# Patient Record
Sex: FEMALE | Race: OTHER | NOT HISPANIC OR LATINO
[De-identification: names, ages, dates, MRNs, and addresses within clinical notes are randomized per-mention and may not be internally consistent; named-entity substitution may affect disease eponyms.]

---

## 2021-03-02 PROBLEM — Z00.00 ENCOUNTER FOR PREVENTIVE HEALTH EXAMINATION: Status: ACTIVE | Noted: 2021-03-02

## 2021-03-09 ENCOUNTER — NON-APPOINTMENT (OUTPATIENT)
Age: 61
End: 2021-03-09

## 2021-03-10 ENCOUNTER — APPOINTMENT (OUTPATIENT)
Dept: BREAST CENTER | Facility: CLINIC | Age: 61
End: 2021-03-10
Payer: COMMERCIAL

## 2021-03-10 VITALS
HEART RATE: 75 BPM | WEIGHT: 175 LBS | DIASTOLIC BLOOD PRESSURE: 76 MMHG | BODY MASS INDEX: 29.16 KG/M2 | HEIGHT: 65 IN | SYSTOLIC BLOOD PRESSURE: 122 MMHG

## 2021-03-10 DIAGNOSIS — Z78.9 OTHER SPECIFIED HEALTH STATUS: ICD-10-CM

## 2021-03-10 PROCEDURE — 99203 OFFICE O/P NEW LOW 30 MIN: CPT

## 2021-03-10 PROCEDURE — 99072 ADDL SUPL MATRL&STAF TM PHE: CPT

## 2021-03-16 NOTE — PAST MEDICAL HISTORY
[Menarche Age ____] : age at menarche was [unfilled] [Menopause Age____] : age at menopause was [unfilled] [Total Preg ___] : G[unfilled] [Abortions ___] : Abortions:[unfilled] [History of Hormone Replacement Treatment] : has no history of hormone replacement treatment [FreeTextEntry7] : yes  [FreeTextEntry8] : no

## 2021-03-16 NOTE — DATA REVIEWED
[FreeTextEntry1] : LHR (12/23/2020) B/L MG- Dense. RUOQ focal asymmetries. RUOQ, 7-8FN coarse benign appearing calc present with question of adjacent amorphous calcs. L benign appearing calcs BIRADS 0\par \par LHR (2/4/2021) Dx R MG & US- Dense. RUOQ faint nodular opacity with a macrocalc. Additionally grouped microcalcs are present which are considered indeterminate. Bx is recommended because of the presence of microcalcs. The asymmetric opacities that were seen on the prior study of 12/23/2020 appear compressible. US- R 0.5cm, 8:30, 8FN hypoechoic nodule with a hyperechoic center, consistent with LN. R 0.7cm, 10:00, 2FN hypoechoic nodule, probably representing a midlly complex cyst. R 0.5cm, 10:00, 6FN hypoechoic nodule with central macrocalcification, this corresponds to the nodular opacity that is seen on the MG. (US guided core is recommended). BIRADS 4. \par \par LHR (2/22/2021) US guided core bx for R 0.5cm, 10:00, 6FN nodule- "Heart shaped" clip. Intraductal papilloma associated with calcs. Adjacent lobular carcinoma in situ associated with calcs. (Path in [place])

## 2021-03-16 NOTE — HISTORY OF PRESENT ILLNESS
[FreeTextEntry1] : ILENE is a 60 year postmenopausal female referred by Claritza Calabrese NP, presents for evaluation of right 0.5cm, 10:00, 6FN Intraductal papilloma associated with calcs and adjacent lobular carcinoma in situ associated with calcs found on screening imaging. Patient has no breast complaints today. Denies nipple discharge, nipple/breast skin changes or dimpling. Denies fever and chills. \par \par \par YAW- 65.5% Lifetime Risk Discussed patients high risk status and recommendations for close surveillance. Patient understands and would like to be followed closely.\par \par Discussed benefits of surveillance and well as implication of the sensitivity of breast MRI. Patient understands and agrees to go forward with MRI for pre op planning.\par \par Discussed recommendation to excise this lesion. With pre op MRI first. Patient expressed understanding.\par \par Discussed importance and implication of genetic testing in regards to her family history and offspring. Patient would like to think about testing.

## 2021-04-01 ENCOUNTER — NON-APPOINTMENT (OUTPATIENT)
Age: 61
End: 2021-04-01

## 2021-04-01 DIAGNOSIS — R92.8 OTHER ABNORMAL AND INCONCLUSIVE FINDINGS ON DIAGNOSTIC IMAGING OF BREAST: ICD-10-CM

## 2021-04-09 ENCOUNTER — NON-APPOINTMENT (OUTPATIENT)
Age: 61
End: 2021-04-09

## 2021-04-13 ENCOUNTER — APPOINTMENT (OUTPATIENT)
Dept: BREAST CENTER | Facility: CLINIC | Age: 61
End: 2021-04-13
Payer: COMMERCIAL

## 2021-04-13 ENCOUNTER — APPOINTMENT (OUTPATIENT)
Dept: BREAST CENTER | Facility: CLINIC | Age: 61
End: 2021-04-13

## 2021-04-13 VITALS
DIASTOLIC BLOOD PRESSURE: 80 MMHG | WEIGHT: 175 LBS | HEART RATE: 81 BPM | HEIGHT: 65 IN | SYSTOLIC BLOOD PRESSURE: 118 MMHG | BODY MASS INDEX: 29.16 KG/M2

## 2021-04-13 PROCEDURE — 99213 OFFICE O/P EST LOW 20 MIN: CPT

## 2021-04-13 PROCEDURE — 99072 ADDL SUPL MATRL&STAF TM PHE: CPT

## 2021-04-13 NOTE — DATA REVIEWED
[FreeTextEntry1] : LHR (12/23/2020) B/L MG- Dense. RUOQ focal asymmetries. RUOQ, 7-8FN coarse benign appearing calc present with question of adjacent amorphous calcs. L benign appearing calcs BIRADS 0\par \par LHR (2/4/2021) Dx R MG & US- Dense. RUOQ faint nodular opacity with a macrocalc. Additionally grouped microcalcs are present which are considered indeterminate. Bx is recommended because of the presence of microcalcs. The asymmetric opacities that were seen on the prior study of 12/23/2020 appear compressible. US- R 0.5cm, 8:30, 8FN hypoechoic nodule with a hyperechoic center, consistent with LN. R 0.7cm, 10:00, 2FN hypoechoic nodule, probably representing a midlly complex cyst. R 0.5cm, 10:00, 6FN hypoechoic nodule with central macrocalcification, this corresponds to the nodular opacity that is seen on the MG. (US guided core is recommended). BIRADS 4. \par \par LHR (2/22/2021) US guided core bx for R 0.5cm, 10:00, 6FN nodule- "Heart shaped" clip. Intraductal papilloma associated with calcs. Adjacent lobular carcinoma in situ associated with calcs. (Path in [place]) \par \par 3/31/2021 (R) bilateral breast MRI showing tissue marker in UOQ right breast at site of known high risk lesion, asymmetric segmental non mass enhancement associated with tissue marker, stereotactic biopsy of residual calcifications located posterior to known high risk lesion prior to excisional biopsy should be considered. BIRADS 4 \par \par 4/6/2021 (R) right stereotactic biopsy Right breast upper outer: breast tissue with atypical ductal hyperplasia and lobular carcinoma in situ (LCIS). Calcifications are present in LCIS and benign epithelium.\par

## 2021-04-13 NOTE — PAST MEDICAL HISTORY
Patient Education     Kegel Exercises  Kegel exercises don’t need special clothing or equipment. They’re easy to learn and simple to do. And if you do them right, no one can tell you’re doing them, so they can be done almost anywhere. Your healthcare provider, nurse, or physical therapist can answer any questions you have and help you get started.    A weak pelvic floor  The pelvic floor muscles may weaken due to aging, pregnancy and vaginal childbirth, injury, surgery, chronic cough, or lack of exercise. If the pelvic floor is weak, your bladder and other pelvic organs may sag out of place. The urethra may also open too easily and allow urine to leak out. Kegel exercises can help you strengthen your pelvic floor muscles. Then they can better support the pelvic organs and control urine flow.  How Kegel exercises are done  Try each of the Kegel exercises described below. When you’re doing them, try not to move your leg, buttock, or stomach muscles:  · Contract as if you were stopping your urine stream. But do it when you’re not urinating.  · Tighten your rectum as if trying not to pass gas. Contract your anus, but don’t move your buttocks.  · You may place a finger or 2 in the vagina and squeeze your finger with your vagina to learn which muscles to tighten.  Try to hold each Kegel for a slow count to 5. You probably won’t be able to hold them for that long at first. But keep practicing. It will get easier as your pelvic floor gets stronger. Eventually, special weights that you place in your vagina may be recommended to help make your Kegels even more effective. Visit your healthcare provider if you have difficulties doing Kegel exercises.  Helpful hints  Here are some tips to follow:  · Do your Kegels as often as you can. The more you do them, the faster you’ll feel the results.  · Pick an activity you do often as a reminder. For instance, do your Kegels every time you sit down.  · Tighten your pelvic floor before  you sneeze, get up from a chair, cough, laugh, or lift. This protects your pelvic floor from injury and can help prevent urine leakage.   Date Last Reviewed: 10/1/2017  © 1760-2306 [employer]. [address] [place] [address], [place] [address]. All rights reserved. This information is not intended as a substitute for professional medical care. Always follow your healthcare professional's instructions.            [Menarche Age ____] : age at menarche was [unfilled] [Menopause Age____] : age at menopause was [unfilled] [Total Preg ___] : G[unfilled] [Abortions ___] : Abortions:[unfilled] [History of Hormone Replacement Treatment] : has no history of hormone replacement treatment [FreeTextEntry5] : no [FreeTextEntry7] : yes  [FreeTextEntry8] : no

## 2021-04-13 NOTE — REASON FOR VISIT
[Follow-Up: _____] : a [unfilled] follow-up visit [FreeTextEntry1] : right breast intraductal papilloma, LCIS, and atypical ductal hyperplasia

## 2021-04-13 NOTE — HISTORY OF PRESENT ILLNESS
[FreeTextEntry1] : ILENE is a 61 year postmenopausal female who presents for follow up of right 0.5cm, 10:00, 6cmFN Intraductal papilloma associated with calcifications and adjacent lobular carcinoma in situ associated with calcifications found on screening imaging. She underwent a breast MRI which identified residual calcifications and non-mass enhancement measuring 4.5 x 2.3cm, posterior to the high risk lesion, recommended for stereotactic biopsy. She completed the biopsy, pathology revealed atypical ductal hyperplasia and LCIS. Patient has no breast complaints today. Denies nipple discharge, nipple/breast skin changes or dimpling. Denies fever and chills. \par \par YAW- 65.5% Lifetime Risk Discussed patients high risk status and recommendations for close surveillance. Patient understands and would like to be followed closely.\par \par Discussed importance and implication of genetic testing in regards to her family history and offspring. Patient will meet with our genetic counselor, Ines Ley today. \par \par Pre-operative education and clearance paperwork were provided to the patient. Discussed risks, benefits and alternatives of surgical excision. Risks including infection, hematoma, seroma formation, as well as infection, and wound complications, risk of need for further procedure. Patient understood risks and benefit and would like to go forward with the procedure.\par \par

## 2021-04-15 ENCOUNTER — OUTPATIENT (OUTPATIENT)
Dept: OUTPATIENT SERVICES | Facility: HOSPITAL | Age: 61
LOS: 1 days | End: 2021-04-15
Payer: COMMERCIAL

## 2021-04-15 DIAGNOSIS — D24.2 BENIGN NEOPLASM OF LEFT BREAST: ICD-10-CM

## 2021-04-15 DIAGNOSIS — D05.02 LOBULAR CARCINOMA IN SITU OF LEFT BREAST: ICD-10-CM

## 2021-04-21 ENCOUNTER — RESULT REVIEW (OUTPATIENT)
Age: 61
End: 2021-04-21

## 2021-04-21 PROCEDURE — 88321 CONSLTJ&REPRT SLD PREP ELSWR: CPT

## 2021-04-22 LAB — SURGICAL PATHOLOGY STUDY: SIGNIFICANT CHANGE UP

## 2021-04-24 ENCOUNTER — NON-APPOINTMENT (OUTPATIENT)
Age: 61
End: 2021-04-24

## 2021-04-27 ENCOUNTER — RESULT REVIEW (OUTPATIENT)
Age: 61
End: 2021-04-27

## 2021-04-27 ENCOUNTER — OUTPATIENT (OUTPATIENT)
Dept: OUTPATIENT SERVICES | Facility: HOSPITAL | Age: 61
LOS: 1 days | End: 2021-04-27
Payer: COMMERCIAL

## 2021-04-27 ENCOUNTER — APPOINTMENT (OUTPATIENT)
Dept: MAMMOGRAPHY | Facility: HOSPITAL | Age: 61
End: 2021-04-27
Payer: COMMERCIAL

## 2021-04-27 PROCEDURE — C1739: CPT

## 2021-04-27 PROCEDURE — 19282 PERQ DEVICE BREAST EA IMAG: CPT

## 2021-04-27 PROCEDURE — 19281 PERQ DEVICE BREAST 1ST IMAG: CPT | Mod: RT

## 2021-04-27 PROCEDURE — 19282 PERQ DEVICE BREAST EA IMAG: CPT | Mod: RT

## 2021-04-27 PROCEDURE — 19281 PERQ DEVICE BREAST 1ST IMAG: CPT

## 2021-04-28 ENCOUNTER — NON-APPOINTMENT (OUTPATIENT)
Age: 61
End: 2021-04-28

## 2021-04-29 ENCOUNTER — TRANSCRIPTION ENCOUNTER (OUTPATIENT)
Age: 61
End: 2021-04-29

## 2021-04-30 ENCOUNTER — OUTPATIENT (OUTPATIENT)
Dept: OUTPATIENT SERVICES | Facility: HOSPITAL | Age: 61
LOS: 1 days | Discharge: HOME | End: 2021-04-30
Payer: COMMERCIAL

## 2021-04-30 ENCOUNTER — RESULT REVIEW (OUTPATIENT)
Age: 61
End: 2021-04-30

## 2021-04-30 ENCOUNTER — APPOINTMENT (OUTPATIENT)
Dept: BREAST CENTER | Facility: CLINIC | Age: 61
End: 2021-04-30

## 2021-04-30 PROCEDURE — 19125 EXCISION BREAST LESION: CPT | Mod: RT

## 2021-04-30 PROCEDURE — 76098 X-RAY EXAM SURGICAL SPECIMEN: CPT | Mod: 26,59,RT

## 2021-04-30 PROCEDURE — 88360 TUMOR IMMUNOHISTOCHEM/MANUAL: CPT | Mod: 26

## 2021-04-30 PROCEDURE — 88305 TISSUE EXAM BY PATHOLOGIST: CPT | Mod: 26

## 2021-04-30 PROCEDURE — 88307 TISSUE EXAM BY PATHOLOGIST: CPT | Mod: 26

## 2021-04-30 PROCEDURE — 76098 X-RAY EXAM SURGICAL SPECIMEN: CPT | Mod: 26

## 2021-04-30 NOTE — BRIEF OPERATIVE NOTE - OPERATION/FINDINGS
Patient prepped and draped in sterile fashion. Lateral periareolar incision. Saviscout guided blunt dissection and cautery to localize R breast lesions x2. Excision lesions and additional posterior margin of more lateral 2nd breast lesion. Specimens marked and xray confirmation of clips in both specimen. Breast tissue reapproximated with Vicryl suture. Incision closed with deep dermal vicryl and 4-0 Monocryl. Steristrips, fluff, and breast binder applied.

## 2021-04-30 NOTE — BRIEF OPERATIVE NOTE - NSICDXBRIEFPREOP_GEN_ALL_CORE_FT
PRE-OP DIAGNOSIS:  Lobular carcinoma in situ (LCIS) of right breast 30-Apr-2021 09:12:52  Lida Up  Atypical ductal hyperplasia of right breast 30-Apr-2021 09:12:39  Lida Up

## 2021-04-30 NOTE — BRIEF OPERATIVE NOTE - NSICDXBRIEFPOSTOP_GEN_ALL_CORE_FT
POST-OP DIAGNOSIS:  Atypical ductal hyperplasia of right breast 30-Apr-2021 09:13:04  Lida Up  Lobular carcinoma in situ (LCIS) of right breast 30-Apr-2021 09:13:00  Lida Up

## 2021-05-05 ENCOUNTER — APPOINTMENT (OUTPATIENT)
Dept: BREAST CENTER | Facility: CLINIC | Age: 61
End: 2021-05-05
Payer: COMMERCIAL

## 2021-05-05 VITALS — WEIGHT: 175 LBS | HEIGHT: 65 IN | BODY MASS INDEX: 29.16 KG/M2

## 2021-05-05 LAB — SURGICAL PATHOLOGY STUDY: SIGNIFICANT CHANGE UP

## 2021-05-05 PROCEDURE — 99024 POSTOP FOLLOW-UP VISIT: CPT

## 2021-05-06 ENCOUNTER — NON-APPOINTMENT (OUTPATIENT)
Age: 61
End: 2021-05-06

## 2021-05-07 NOTE — PAST MEDICAL HISTORY
[Menarche Age ____] : age at menarche was [unfilled] [Menopause Age____] : age at menopause was [unfilled] [Total Preg ___] : G[unfilled] [Abortions ___] : Abortions:[unfilled] [History of Hormone Replacement Treatment] : has no history of hormone replacement treatment [FreeTextEntry7] : yes  [FreeTextEntry5] : no [FreeTextEntry8] : no

## 2021-05-07 NOTE — HISTORY OF PRESENT ILLNESS
[FreeTextEntry1] : ILENE is a 61 year postmenopausal female who presents for post op up of right 0.5cm, 10:00, 6cmFN Intraductal papilloma associated with calcifications and adjacent lobular carcinoma in situ associated with calcifications found on screening imaging and right atypical ductal hyperplasia and LCIS found on pre-operative MRI s/p right breast excisional biopsy on 4/302021 final surgical pathology pending. \par \par YAW- 65.5% Lifetime Risk Discussed patients high risk status and recommendations for close surveillance. Patient understands and would like to be followed closely.\par \par Discussed importance and implication of genetic testing in regards to her family history and offspring. Patient will meet with our genetic counselor, Ines Ley today. \par \par \par

## 2021-05-25 ENCOUNTER — APPOINTMENT (OUTPATIENT)
Dept: RADIATION ONCOLOGY | Facility: CLINIC | Age: 61
End: 2021-05-25
Payer: COMMERCIAL

## 2021-05-25 VITALS
BODY MASS INDEX: 29.95 KG/M2 | DIASTOLIC BLOOD PRESSURE: 82 MMHG | WEIGHT: 180 LBS | SYSTOLIC BLOOD PRESSURE: 120 MMHG | OXYGEN SATURATION: 98 % | HEART RATE: 88 BPM | TEMPERATURE: 98.7 F | RESPIRATION RATE: 16 BRPM

## 2021-05-25 PROCEDURE — 99205 OFFICE O/P NEW HI 60 MIN: CPT | Mod: 25

## 2021-05-25 RX ORDER — COLD-HOT PACK
EACH MISCELLANEOUS
Refills: 0 | Status: ACTIVE | COMMUNITY

## 2021-05-25 RX ORDER — MULTIVITAMIN
TABLET ORAL
Refills: 0 | Status: ACTIVE | COMMUNITY

## 2021-05-25 RX ORDER — ASCORBIC ACID 500 MG
TABLET ORAL
Refills: 0 | Status: ACTIVE | COMMUNITY

## 2021-05-25 NOTE — PHYSICAL EXAM
[Respiration, Rhythm And Depth] : normal respiratory rhythm and effort [Auscultation Breath Sounds / Voice Sounds] : lungs were clear to auscultation bilaterally [Heart Rate And Rhythm] : heart rate and rhythm were normal [Heart Sounds] : normal S1 and S2 [Breast Palpation Mass] : no palpable masses [No UE Edema] : there is no upper extremity edema [Cervical Lymph Nodes Enlarged Posterior Bilaterally] : posterior cervical [Cervical Lymph Nodes Enlarged Anterior Bilaterally] : anterior cervical [Supraclavicular Lymph Nodes Enlarged Bilaterally] : supraclavicular [Axillary Lymph Nodes Enlarged Bilaterally] : axillary [Skin Color & Pigmentation] : normal skin color and pigmentation [] : no rash [Normal] : oriented to person, place and time, the affect was normal, the mood was normal and not anxious [de-identified] : well healed incision to right breast

## 2021-05-25 NOTE — HISTORY OF PRESENT ILLNESS
[FreeTextEntry1] : Ms. Eunice Hightower is a 61 year old female referred by Dr. Patterson for consideration of radiation therapy for a RIGHT breast microinvasive carcinoma (pT1mi pNx). She is s/p excision on 4/30/21.\par \par A right 0.5cm, 10:00, 6FN Intraductal papilloma associated with calcs and adjacent lobular carcinoma in situ associated with calcs found on screening imaging. \par \par She underwent a breast MRI which identified residual calcifications and non-mass enhancement measuring 4.5 x 2.3 cm, posterior to the high risk lesion, recommended for stereotactic biopsy. She completed the biopsy, pathology revealed atypical ductal hyperplasia and LCIS.\par \par On 4/30/21, she underwent excision of right breast lesion x 2. Pathology showed the following: \par 1. Breast, right, lesion 1; excision: Lobular carcinoma in situ (LCIS), classic type. Intraductal papilloma. Biopsy site changes.\par Calcifications associated with LCIS and intraductal papilloma.\par 2. Breast, right, lesion 2; excision: Micro-invasive carcinoma (< 1 mm). Margins: Anterior: 1 mm, all remaining margins: over 5 mm.\par Lobular carcinoma in situ (LCIS), classic type. Calcifications associated with LCIS.\par 3. Breast, right, new deep margin lesion 2; excision: Lobular carcinoma in situ (LCIS), classic type. Fibroadenomatoid changes.\par Calcifications associated with LCIS.\par ER, [place] and HER-2 were attempted, but microinvasive carcinoma is not present on IHC slides.\par \par PATHOLOGIC STAGE CLASSIFICATION (pTNM, AJCC 8th Edition).\par Primary Tumor (pT): pT1mi pNx\par \par Patient is scheduled to follow up with Dr. Hernandez on 6/2/21.\par \par Patient reports she feels well. She denies breast pain or edema. She denies fever, chills, fatigue, CP, SOB, or any further complaints. LMP was approx 10+ years ago. She denies history of radiation.\par \par Patient is a non-smoker. She lives in [facility]. She works as a CPA.

## 2021-06-02 ENCOUNTER — LABORATORY RESULT (OUTPATIENT)
Age: 61
End: 2021-06-02

## 2021-06-02 ENCOUNTER — APPOINTMENT (OUTPATIENT)
Dept: HEMATOLOGY ONCOLOGY | Facility: CLINIC | Age: 61
End: 2021-06-02
Payer: COMMERCIAL

## 2021-06-02 ENCOUNTER — TRANSCRIPTION ENCOUNTER (OUTPATIENT)
Age: 61
End: 2021-06-02

## 2021-06-02 VITALS
WEIGHT: 179 LBS | HEIGHT: 65 IN | HEART RATE: 83 BPM | BODY MASS INDEX: 29.82 KG/M2 | DIASTOLIC BLOOD PRESSURE: 77 MMHG | SYSTOLIC BLOOD PRESSURE: 115 MMHG | OXYGEN SATURATION: 97 % | RESPIRATION RATE: 18 BRPM

## 2021-06-02 PROCEDURE — 99215 OFFICE O/P EST HI 40 MIN: CPT | Mod: 25

## 2021-06-02 PROCEDURE — 36415 COLL VENOUS BLD VENIPUNCTURE: CPT

## 2021-06-02 RX ORDER — CHOLECALCIFEROL (VITAMIN D3) 50 MCG
2000 CAPSULE ORAL
Refills: 0 | Status: ACTIVE | COMMUNITY

## 2021-06-02 NOTE — HISTORY OF PRESENT ILLNESS
[Disease: _____________________] : Disease: [unfilled] [T: ___] : T[unfilled] [N: ___] : N[unfilled] [de-identified] : 61 year old female presents today for initial consultation of breast cancer, referred by Dr. Naty Patterson.  \par \par LHR (12/23/2020) B/L MG- Dense. RUOQ focal asymmetries. RUOQ, 7-8FN coarse benign appearing calc present with question of adjacent amorphous calcs. L benign appearing calcs BIRADS 0\par \par LHR (2/4/2021) Dx R MG & US- Dense. RUOQ faint nodular opacity with a macrocalc. Additionally grouped microcalcs are present which are considered indeterminate. Bx is recommended because of the presence of microcalcs. The asymmetric opacities that were seen on the prior study of 12/23/2020 appear compressible. US- R 0.5cm, 8:30, 8FN hypoechoic nodule with a hyperechoic center, consistent with LN. R 0.7cm, 10:00, 2FN hypoechoic nodule, probably representing a midlly complex cyst. R 0.5cm, 10:00, 6FN hypoechoic nodule with central macrocalcification, this corresponds to the nodular opacity that is seen on the MG. (US guided core is recommended).\par \par LHR (2/22/2021) US guided core bx for R 0.5cm, 10:00, 6FN nodule- "Heart shaped" clip. Intraductal papilloma associated with calcs. Adjacent lobular carcinoma in situ associated with calcs. (Path in [place]) \par \par 3/31/2021 (R) bilateral breast MRI showing tissue marker in UOQ right breast at site of known high risk lesion, asymmetric segmental non mass enhancement associated with tissue marker, stereotactic biopsy of residual calcifications located posterior to known high risk lesion prior to excisional biopsy should be considered. \par \par 4/6/2021 (L[facility]) right stereotactic biopsy Right breast upper outer: breast tissue with atypical ductal hyperplasia and lobular carcinoma in situ (LCIS). Calcifications are present in LCIS and benign epithelium.\par \par On 4/30/21, she underwent excision of right breast lesion x 2. Pathology showed the following: \par 1. Breast, right, lesion 1; excision: Lobular carcinoma in situ (LCIS), classic type. Intraductal papilloma. Biopsy site changes.\par Calcifications associated with LCIS and intraductal papilloma.\par 2. Breast, right, lesion 2; excision: Micro-invasive carcinoma (< 1 mm). Margins: Anterior: 1 mm, all remaining margins: over 5 mm.\par Lobular carcinoma in situ (LCIS), classic type. Calcifications associated with LCIS.\par 3. Breast, right, new deep margin lesion 2; excision: Lobular carcinoma in situ (LCIS), classic type. Fibroadenomatoid changes.\par Calcifications associated with LCIS.\par ER, [place] and HER-2 were attempted, but microinvasive carcinoma is not present on IHC slides.\par \par She met with Dr. Turpin for RT consult- \par \par Risk Factors:\par Age at menarche- 10\par Age at menopause- 49\par G2, P0 ( 2 abortions)\par Age at first live birth- n/a\par OCP- 6 months\par HRT- denies\par Family History- Paternal cousin with breast cancer at 63, paternal great aunt 88 with breast cancer\par Genetics- negative\par Smoker- denies\par  [de-identified] : :LCIS with microinvasion

## 2021-06-02 NOTE — ASSESSMENT
[FreeTextEntry1] : 60 yo female referred by Dr. Naty Smalls for evaluation of LCIS with microinvasive area of carcinoma of the right breast. T1mic, Nx diagnosed April 2021.\par \par We reviewed pathology report, indication for risk reduction treatment with either SERM or AI.  Side effects and monitoring parameters were reviewed with patient.  She has LCIS with microinvasion thus endocrine treatment offers over 50% risk reduction of developing invasive breast cancer. \par She was offered anastrozole 1mg. \par She is not sure if she wants to start therapy now and plans to discuss it with her family and naturopathic doctor.\par \par Discussed weight control and healthy eating habits.  Encourage to participate in moderate exercise.\par Discussed with patient benefit of low fat and low carbohydrate diet.\par \par \par

## 2021-06-02 NOTE — PHYSICAL EXAM
[Fully active, able to carry on all pre-disease performance without restriction] : Status 0 - Fully active, able to carry on all pre-disease performance without restriction [Normal] : affect appropriate [de-identified] : right breast healed incision

## 2021-06-03 LAB
25(OH)D3 SERPL-MCNC: 50.1 NG/ML
BASOPHILS # BLD AUTO: 0.06 K/UL
BASOPHILS NFR BLD AUTO: 1.4 %
CANCER AG27-29 SERPL-ACNC: 19.9 U/ML
CEA SERPL-MCNC: 2.8 NG/ML
EOSINOPHIL # BLD AUTO: 0.05 K/UL
EOSINOPHIL NFR BLD AUTO: 1.2 %
HCT VFR BLD CALC: 38.5 %
HGB BLD-MCNC: 12.5 G/DL
IMM GRANULOCYTES NFR BLD AUTO: 0.2 %
LYMPHOCYTES # BLD AUTO: 1.69 K/UL
LYMPHOCYTES NFR BLD AUTO: 39.9 %
MAN DIFF?: NORMAL
MCHC RBC-ENTMCNC: 29.3 PG
MCHC RBC-ENTMCNC: 32.5 GM/DL
MCV RBC AUTO: 90.2 FL
MONOCYTES # BLD AUTO: 0.28 K/UL
MONOCYTES NFR BLD AUTO: 6.6 %
NEUTROPHILS # BLD AUTO: 2.15 K/UL
NEUTROPHILS NFR BLD AUTO: 50.7 %
PLATELET # BLD AUTO: 281 K/UL
RBC # BLD: 4.27 M/UL
RBC # FLD: 13.2 %
WBC # FLD AUTO: 4.24 K/UL

## 2021-06-04 ENCOUNTER — NON-APPOINTMENT (OUTPATIENT)
Age: 61
End: 2021-06-04

## 2021-06-09 ENCOUNTER — NON-APPOINTMENT (OUTPATIENT)
Age: 61
End: 2021-06-09

## 2021-06-11 ENCOUNTER — NON-APPOINTMENT (OUTPATIENT)
Age: 61
End: 2021-06-11

## 2021-08-24 ENCOUNTER — TRANSCRIPTION ENCOUNTER (OUTPATIENT)
Age: 61
End: 2021-08-24

## 2021-09-08 ENCOUNTER — APPOINTMENT (OUTPATIENT)
Dept: HEMATOLOGY ONCOLOGY | Facility: CLINIC | Age: 61
End: 2021-09-08

## 2021-11-03 ENCOUNTER — APPOINTMENT (OUTPATIENT)
Dept: BREAST CENTER | Facility: CLINIC | Age: 61
End: 2021-11-03
Payer: COMMERCIAL

## 2021-11-03 VITALS
HEIGHT: 65 IN | WEIGHT: 175 LBS | SYSTOLIC BLOOD PRESSURE: 147 MMHG | BODY MASS INDEX: 29.16 KG/M2 | HEART RATE: 83 BPM | OXYGEN SATURATION: 97 % | DIASTOLIC BLOOD PRESSURE: 96 MMHG

## 2021-11-03 PROCEDURE — 99213 OFFICE O/P EST LOW 20 MIN: CPT

## 2021-11-09 NOTE — DATA REVIEWED
[FreeTextEntry1] : LHR (12/23/2020) B/L MG- Dense. RUOQ focal asymmetries. RUOQ, 7-8FN coarse benign appearing calc present with question of adjacent amorphous calcs. L benign appearing calcs BIRADS 0\par \par LHR (2/4/2021) Dx R MG & US- Dense. RUOQ faint nodular opacity with a macrocalc. Additionally grouped microcalcs are present which are considered indeterminate. Bx is recommended because of the presence of microcalcs. The asymmetric opacities that were seen on the prior study of 12/23/2020 appear compressible. US- R 0.5cm, 8:30, 8FN hypoechoic nodule with a hyperechoic center, consistent with LN. R 0.7cm, 10:00, 2FN hypoechoic nodule, probably representing a midlly complex cyst. R 0.5cm, 10:00, 6FN hypoechoic nodule with central macrocalcification, this corresponds to the nodular opacity that is seen on the MG. (US guided core is recommended). BIRADS 4. \par \par R (2/22/2021) US guided core bx for R 0.5cm, 10:00, 6FN nodule- "Heart shaped" clip. Intraductal papilloma associated with calcs. Adjacent lobular carcinoma in situ associated with calcs. (Path in [place]) \par \par 3/31/2021 ([facility]) bilateral breast MRI showing tissue marker in UOQ right breast at site of known high risk lesion, asymmetric segmental non mass enhancement associated with tissue marker, stereotactic biopsy of residual calcifications located posterior to known high risk lesion prior to excisional biopsy should be considered. BIRADS 4 \par \par 4/6/2021 ([facility]) right stereotactic biopsy Right breast upper outer: breast tissue with atypical ductal hyperplasia and lobular carcinoma in situ (LCIS). Calcifications are present in LCIS and benign epithelium.\par \par 4/30/21 ([facility]) surgical path: 1. Breast, right, lesion 1; excision: - Lobular carcinoma in situ (LCIS), classic type - Intraductal papilloma - Biopsy site changes - Calcifications associated with LCIS and intraductal papilloma 2. Breast, right, lesion 2; excision: - Micro-invasive carcinoma (< 1 mm), see note and synoptic report - Margins: Anterior: 1 mm All remaining margins: over 5 mm - Lobular carcinoma in situ (LCIS), classic type - Biopsy site changes - Calcifications associated with LCIS 3. Breast, right, new deep margin lesion 2; excision: - Lobular carcinoma in situ (LCIS), classic type - Fibroadenomatoid changes - Calcifications associated with LCISER, ID and HER-2 were attempted, but microinvasive carcinoma is\par not present on IHC slides.\par \par

## 2021-11-09 NOTE — HISTORY OF PRESENT ILLNESS
[FreeTextEntry1] : 61 year postmenopausal female who presents for follow-up of right breast microinvasive carcinoma (unable to perform IHC) found on surgical pathology from a right breast excision on 4/30/21 that was done for intraductal papilloma, LCIS, and ADH. The patient met with Dr. Hernandez (med onc) and declined risk reducing medication; she also declined XRT. Pt denies palpable masses, skin lesions/changes, nipple discharge, or other breast complaints.\par \par The patient states that she is being managed with "Natrual estrogen blockers" by a naturopathic providers in conjunction with her PCP. She is also taking "Hoxley"Through her naturopathic provider's workup she was found to be hypothyroid, and is on levothyroxine.\par \par She met with a genetics counselor and completed testing, and no pathogenic mutations were identified ([employer])\par \par \par

## 2021-11-09 NOTE — PAST MEDICAL HISTORY
[Menarche Age ____] : age at menarche was [unfilled] [Menopause Age____] : age at menopause was [unfilled] [Total Preg ___] : G[unfilled] [Abortions ___] : Abortions:[unfilled] [History of Hormone Replacement Treatment] : has no history of hormone replacement treatment [FreeTextEntry5] : no [FreeTextEntry7] : yes  [FreeTextEntry8] : no

## 2021-11-18 ENCOUNTER — NON-APPOINTMENT (OUTPATIENT)
Age: 61
End: 2021-11-18

## 2022-05-18 ENCOUNTER — APPOINTMENT (OUTPATIENT)
Dept: BREAST CENTER | Facility: CLINIC | Age: 62
End: 2022-05-18
Payer: COMMERCIAL

## 2022-05-18 VITALS
HEART RATE: 79 BPM | DIASTOLIC BLOOD PRESSURE: 80 MMHG | OXYGEN SATURATION: 97 % | SYSTOLIC BLOOD PRESSURE: 137 MMHG | BODY MASS INDEX: 28.9 KG/M2 | WEIGHT: 171.38 LBS | HEIGHT: 64.5 IN

## 2022-05-18 PROCEDURE — 99213 OFFICE O/P EST LOW 20 MIN: CPT

## 2022-05-19 NOTE — PAST MEDICAL HISTORY
[Menarche Age ____] : age at menarche was [unfilled] [Menopause Age____] : age at menopause was [unfilled] [Total Preg ___] : G[unfilled] [Abortions ___] : Abortions:[unfilled] [History of Hormone Replacement Treatment] : has no history of hormone replacement treatment [FreeTextEntry5] : no [FreeTextEntry6] : no [FreeTextEntry7] : yes  [FreeTextEntry8] : no

## 2022-05-19 NOTE — HISTORY OF PRESENT ILLNESS
[FreeTextEntry1] : 62 year old female who presents for follow-up of right breast microinvasive carcinoma (unable to perform IHC) found on surgical pathology from a right breast excision on 4/30/21 that was done for intraductal papilloma, LCIS, and ADH. The patient met with Dr. Hernandez (med onc) and declined risk reducing medication; she also declined XRT. Pt denies palpable masses, skin lesions/changes, nipple discharge, or other breast complaints.\par \par The patient states that she is being managed with "Natrual estrogen blockers" by a naturopathic providers in conjunction with her PCP. She is also taking "Hoxley" through her naturopathic provider's workup she was found to be hypothyroid, and is on levothyroxine.\par \par She met with a genetics counselor and completed testing, and no pathogenic mutations were identified ([employer])\par \par \par

## 2022-05-19 NOTE — DATA REVIEWED
[FreeTextEntry1] : [facility] (12/23/2020) B/L MG- Dense. RUOQ focal asymmetries. RUOQ, 7-8FN coarse benign appearing calc present with question of adjacent amorphous calcs. L benign appearing calcs BIRADS 0\par \par [facility] (2/4/2021) Dx R MG & US- Dense. RUOQ faint nodular opacity with a macrocalc. Additionally grouped microcalcs are present which are considered indeterminate. Bx is recommended because of the presence of microcalcs. The asymmetric opacities that were seen on the prior study of 12/23/2020 appear compressible. US- R 0.5cm, 8:30, 8FN hypoechoic nodule with a hyperechoic center, consistent with LN. R 0.7cm, 10:00, 2FN hypoechoic nodule, probably representing a midlly complex cyst. R 0.5cm, 10:00, 6FN hypoechoic nodule with central macrocalcification, this corresponds to the nodular opacity that is seen on the MG. (US guided core is recommended). BIRADS 4. \par \par R (2/22/2021) US guided core bx for R 0.5cm, 10:00, 6FN nodule- "Heart shaped" clip. Intraductal papilloma associated with calcs. Adjacent lobular carcinoma in situ associated with calcs. (Path in [place]) \par \par 3/31/2021 ([facility]) bilateral breast MRI showing tissue marker in UOQ right breast at site of known high risk lesion, asymmetric segmental non mass enhancement associated with tissue marker, stereotactic biopsy of residual calcifications located posterior to known high risk lesion prior to excisional biopsy should be considered. BIRADS 4 \par \par 4/6/2021 ([facility]) right stereotactic biopsy Right breast upper outer: breast tissue with atypical ductal hyperplasia and lobular carcinoma in situ (LCIS). Calcifications are present in LCIS and benign epithelium.\par \par 4/30/21 ([facility]) surgical path: 1. Breast, right, lesion 1; excision: - Lobular carcinoma in situ (LCIS), classic type - Intraductal papilloma - Biopsy site changes - Calcifications associated with LCIS and intraductal papilloma 2. Breast, right, lesion 2; excision: - Micro-invasive carcinoma (< 1 mm), see note and synoptic report - Margins: Anterior: 1 mm All remaining margins: over 5 mm - Lobular carcinoma in situ (LCIS), classic type - Biopsy site changes - Calcifications associated with LCIS 3. Breast, right, new deep margin lesion 2; excision: - Lobular carcinoma in situ (LCIS), classic type - Fibroadenomatoid changes - Calcifications associated with LCISER, [place] and HER-2 were attempted, but microinvasive carcinoma is\par not present on IHC slides.\par \par 11/15/21 ([facility]) MRI: No MRI evidence of malignancy. Patient will be due for mammographic imaging in February 2022. Given first mammographic imaging since surgical biopsy study should be performed as diagnostic imaging. BI-RADS 2. \par \par 12/6/21 ([facility]) b/l dx mmg and US: scattered areas of fibroglandular density. No mammographic or ultrasonographic evidence of malignancy in this patient status post excision of papilloma, ADH, and LCIS in the right breast. BI-RADS 2.

## 2022-12-13 ENCOUNTER — APPOINTMENT (OUTPATIENT)
Dept: BREAST CENTER | Facility: CLINIC | Age: 62
End: 2022-12-13

## 2022-12-13 VITALS
SYSTOLIC BLOOD PRESSURE: 123 MMHG | HEIGHT: 64.5 IN | DIASTOLIC BLOOD PRESSURE: 82 MMHG | BODY MASS INDEX: 30.36 KG/M2 | HEART RATE: 59 BPM | WEIGHT: 180 LBS

## 2022-12-13 PROCEDURE — 99213 OFFICE O/P EST LOW 20 MIN: CPT

## 2022-12-13 RX ORDER — LEVOTHYROXINE SODIUM 0.17 MG/1
TABLET ORAL
Refills: 0 | Status: ACTIVE | COMMUNITY

## 2022-12-13 NOTE — PHYSICAL EXAM
[No Supraclavicular Adenopathy] : no supraclavicular adenopathy [Examined in the supine and seated position] : examined in the supine and seated position [Symmetrical] : symmetrical [No dominant masses] : no dominant masses in right breast  [No dominant masses] : no dominant masses left breast [No Nipple Retraction] : no left nipple retraction [No Nipple Discharge] : no left nipple discharge [Breast Nipple Inversion] : nipples not inverted [Breast Nipple Retraction] : nipples not retracted [Breast Nipple Flattening] : nipples not flattened [Breast Nipple Fissures] : nipples not fissured [No Axillary Lymphadenopathy] : no left axillary lymphadenopathy [No Edema] : no edema [No Rashes] : no rashes [No Ulceration] : no ulceration [de-identified] : well healed circumareolar incision

## 2022-12-13 NOTE — ASSESSMENT
[FreeTextEntry1] : 61 yo female presents with a history of right breast microinvasive carcinoma found on pathology from excisional biopsy for ADH/LCIS/IDP in 2021. She is doing well, without complaint. Still on the "natural estrogen blockers". Mammo/US today was benign. Reviewed that she should proceed with an MRI in June to provide optimal year round screening. Patient understands, will be scheduled in June, same day as her follow-up appointment.

## 2022-12-13 NOTE — PAST MEDICAL HISTORY
[Menarche Age ____] : age at menarche was [unfilled] [Menopause Age____] : age at menopause was [unfilled] [Total Preg ___] : G[unfilled] [Abortions ___] : Abortions:[unfilled] [History of Hormone Replacement Treatment] : has no history of hormone replacement treatment [FreeTextEntry5] : no [FreeTextEntry6] : no [FreeTextEntry7] : no [FreeTextEntry8] : no

## 2022-12-13 NOTE — DATA REVIEWED
[FreeTextEntry1] : [facility] (12/23/2020) B/L MG- Dense. RUOQ focal asymmetries. RUOQ, 7-8FN coarse benign appearing calc present with question of adjacent amorphous calcs. L benign appearing calcs BIRADS 0\par \par [facility] (2/4/2021) Dx R MG & US- Dense. RUOQ faint nodular opacity with a macrocalc. Additionally grouped microcalcs are present which are considered indeterminate. Bx is recommended because of the presence of microcalcs. The asymmetric opacities that were seen on the prior study of 12/23/2020 appear compressible. US- R 0.5cm, 8:30, 8FN hypoechoic nodule with a hyperechoic center, consistent with LN. R 0.7cm, 10:00, 2FN hypoechoic nodule, probably representing a midlly complex cyst. R 0.5cm, 10:00, 6FN hypoechoic nodule with central macrocalcification, this corresponds to the nodular opacity that is seen on the MG. (US guided core is recommended). BIRADS 4. \par \par R (2/22/2021) US guided core bx for R 0.5cm, 10:00, 6FN nodule- "Heart shaped" clip. Intraductal papilloma associated with calcs. Adjacent lobular carcinoma in situ associated with calcs. (Path in [place]) \par \par 3/31/2021 ([facility]) bilateral breast MRI showing tissue marker in UOQ right breast at site of known high risk lesion, asymmetric segmental non mass enhancement associated with tissue marker, stereotactic biopsy of residual calcifications located posterior to known high risk lesion prior to excisional biopsy should be considered. BIRADS 4 \par \par 4/6/2021 ([facility]) right stereotactic biopsy Right breast upper outer: breast tissue with atypical ductal hyperplasia and lobular carcinoma in situ (LCIS). Calcifications are present in LCIS and benign epithelium.\par \par 4/30/21 ([facility]) surgical path: 1. Breast, right, lesion 1; excision: - Lobular carcinoma in situ (LCIS), classic type - Intraductal papilloma - Biopsy site changes - Calcifications associated with LCIS and intraductal papilloma 2. Breast, right, lesion 2; excision: - Micro-invasive carcinoma (< 1 mm), see note and synoptic report - Margins: Anterior: 1 mm All remaining margins: over 5 mm - Lobular carcinoma in situ (LCIS), classic type - Biopsy site changes - Calcifications associated with LCIS 3. Breast, right, new deep margin lesion 2; excision: - Lobular carcinoma in situ (LCIS), classic type - Fibroadenomatoid changes - Calcifications associated with LCISER, [place] and HER-2 were attempted, but microinvasive carcinoma is\par not present on IHC slides.\par \par 11/15/21 ([facility]) MRI: No MRI evidence of malignancy. Patient will be due for mammographic imaging in February 2022. Given first mammographic imaging since surgical biopsy study should be performed as diagnostic imaging. BI-RADS 2. \par \par 12/6/21 ([facility]) b/l dx mmg and US: scattered areas of fibroglandular density. No mammographic or ultrasonographic evidence of malignancy in this patient status post excision of papilloma, ADH, and LCIS in the right breast. BI-RADS 2. \par \par 12/13/22 ([facility]) b/l dx mmg and US: heterogenously dense. No e/o malignancy. BI-RADS 2.

## 2022-12-13 NOTE — HISTORY OF PRESENT ILLNESS
[FreeTextEntry1] : 62 year old female who presents for follow-up of right breast microinvasive carcinoma (unable to perform IHC) found on surgical pathology from a right breast excision on 4/30/21 that was done for intraductal papilloma, LCIS, and ADH. The patient met with Dr. Hernandez (med onc) and declined risk reducing medication; she also declined XRT. Pt denies palpable masses, skin lesions/changes, nipple discharge, or other breast complaints.\par \par The patient states that she is being managed with "natural estrogen blockers" by a naturopathic providers in conjunction with her PCP. She is also taking "Hoxley" through her naturopathic provider's workup she was found to be hypothyroid, and is on levothyroxine.\par \par She met with a genetics counselor and completed testing, and no pathogenic mutations were identified ([employer])

## 2023-06-02 ENCOUNTER — APPOINTMENT (OUTPATIENT)
Dept: BREAST CENTER | Facility: CLINIC | Age: 63
End: 2023-06-02
Payer: COMMERCIAL

## 2023-06-02 VITALS
BODY MASS INDEX: 28.16 KG/M2 | SYSTOLIC BLOOD PRESSURE: 107 MMHG | HEIGHT: 64.5 IN | HEART RATE: 73 BPM | WEIGHT: 167 LBS | OXYGEN SATURATION: 95 % | DIASTOLIC BLOOD PRESSURE: 73 MMHG

## 2023-06-02 DIAGNOSIS — Z91.89 OTHER SPECIFIED PERSONAL RISK FACTORS, NOT ELSEWHERE CLASSIFIED: ICD-10-CM

## 2023-06-02 PROCEDURE — 99213 OFFICE O/P EST LOW 20 MIN: CPT

## 2023-06-05 PROBLEM — Z91.89 AT HIGH RISK FOR BREAST CANCER: Status: ACTIVE | Noted: 2023-06-05

## 2023-06-05 NOTE — HISTORY OF PRESENT ILLNESS
[FreeTextEntry1] : 63 year old female who presents for follow-up of right breast microinvasive carcinoma (IHC unable to be performed) found on surgical pathology from a right breast excision on 4/30/21 that was done for intraductal papilloma, LCIS, and ADH. The patient met with Dr. Hernandez (med onc) and declined risk reducing medication; she also declined XRT. Pt denies palpable masses, skin lesions/changes, nipple discharge, or other breast complaints.\par \par The patient states that she is being managed with "natural estrogen blockers" by a naturopathic providers in conjunction with her PCP. She is also taking "Hoxley" through her naturopathic provider's workup she was found to be hypothyroid, and is on levothyroxine.\par \par She met with a genetics counselor and completed testing, and no pathogenic mutations were identified ([employer])

## 2023-06-05 NOTE — DATA REVIEWED
[FreeTextEntry1] : [facility] (12/23/2020) B/L MG- Dense. RUOQ focal asymmetries. RUOQ, 7-8FN coarse benign appearing calc present with question of adjacent amorphous calcs. L benign appearing calcs BIRADS 0\par \par [facility] (2/4/2021) Dx R MG & US- Dense. RUOQ faint nodular opacity with a macrocalc. Additionally grouped microcalcs are present which are considered indeterminate. Bx is recommended because of the presence of microcalcs. The asymmetric opacities that were seen on the prior study of 12/23/2020 appear compressible. US- R 0.5cm, 8:30, 8FN hypoechoic nodule with a hyperechoic center, consistent with LN. R 0.7cm, 10:00, 2FN hypoechoic nodule, probably representing a midlly complex cyst. R 0.5cm, 10:00, 6FN hypoechoic nodule with central macrocalcification, this corresponds to the nodular opacity that is seen on the MG. (US guided core is recommended). BIRADS 4. \par \par R (2/22/2021) US guided core bx for R 0.5cm, 10:00, 6FN nodule- "Heart shaped" clip. Intraductal papilloma associated with calcs. Adjacent lobular carcinoma in situ associated with calcs. (Path in [place]) \par \par 3/31/2021 ([facility]) bilateral breast MRI showing tissue marker in UOQ right breast at site of known high risk lesion, asymmetric segmental non mass enhancement associated with tissue marker, stereotactic biopsy of residual calcifications located posterior to known high risk lesion prior to excisional biopsy should be considered. BIRADS 4 \par \par 4/6/2021 ([facility]) right stereotactic biopsy Right breast upper outer: breast tissue with atypical ductal hyperplasia and lobular carcinoma in situ (LCIS). Calcifications are present in LCIS and benign epithelium.\par \par 4/30/21 ([facility]) surgical path: 1. Breast, right, lesion 1; excision: - Lobular carcinoma in situ (LCIS), classic type - Intraductal papilloma - Biopsy site changes - Calcifications associated with LCIS and intraductal papilloma 2. Breast, right, lesion 2; excision: - Micro-invasive carcinoma (< 1 mm), see note and synoptic report - Margins: Anterior: 1 mm All remaining margins: over 5 mm - Lobular carcinoma in situ (LCIS), classic type - Biopsy site changes - Calcifications associated with LCIS 3. Breast, right, new deep margin lesion 2; excision: - Lobular carcinoma in situ (LCIS), classic type - Fibroadenomatoid changes - Calcifications associated with LCISER, [place] and HER-2 were attempted, but microinvasive carcinoma is\par not present on IHC slides.\par \par 11/15/21 ([facility]) MRI: No MRI evidence of malignancy. Patient will be due for mammographic imaging in February 2022. Given first mammographic imaging since surgical biopsy study should be performed as diagnostic imaging. BI-RADS 2. \par \par 12/6/21 ([facility]) b/l dx mmg and US: scattered areas of fibroglandular density. No mammographic or ultrasonographic evidence of malignancy in this patient status post excision of papilloma, ADH, and LCIS in the right breast. BI-RADS 2. \par \par 12/13/22 ([facility]) b/l dx mmg and US: heterogenously dense. No e/o malignancy. BI-RADS 2. \par \par 6/2/23 ([facility]) MRI: results pending

## 2023-06-05 NOTE — PHYSICAL EXAM
[No Supraclavicular Adenopathy] : no supraclavicular adenopathy [Examined in the supine and seated position] : examined in the supine and seated position [Symmetrical] : symmetrical [No dominant masses] : no dominant masses in right breast  [No dominant masses] : no dominant masses left breast [No Nipple Retraction] : no left nipple retraction [No Nipple Discharge] : no left nipple discharge [No Axillary Lymphadenopathy] : no left axillary lymphadenopathy [No Edema] : no edema [No Rashes] : no rashes [No Ulceration] : no ulceration [Breast Nipple Inversion] : nipples not inverted [Breast Nipple Retraction] : nipples not retracted [Breast Nipple Flattening] : nipples not flattened [Breast Nipple Fissures] : nipples not fissured [de-identified] : well healed circumareolar incision

## 2023-06-05 NOTE — ASSESSMENT
[FreeTextEntry1] : 64 yo female presents with a history of right breast microinvasive carcinoma found on pathology from excisional biopsy for ADH/LCIS/IDP in 2021. She is doing well, without complaint. Still on the "natural estrogen blockers". Mammo/US today was benign. Reviewed that she should proceed with annual mammo/US in December and RTC same day, per patient preference. Also reviewed the importance of self breast awareness. Will reach out to her with the results of the MRI once available. The patient verbalized understanding, and is in agreement with the plan.

## 2023-11-22 ENCOUNTER — NON-APPOINTMENT (OUTPATIENT)
Age: 63
End: 2023-11-22

## 2023-11-26 ENCOUNTER — NON-APPOINTMENT (OUTPATIENT)
Age: 63
End: 2023-11-26

## 2023-12-01 ENCOUNTER — APPOINTMENT (OUTPATIENT)
Dept: BREAST CENTER | Facility: CLINIC | Age: 63
End: 2023-12-01
Payer: COMMERCIAL

## 2023-12-01 VITALS
BODY MASS INDEX: 28.67 KG/M2 | OXYGEN SATURATION: 96 % | SYSTOLIC BLOOD PRESSURE: 120 MMHG | WEIGHT: 170 LBS | DIASTOLIC BLOOD PRESSURE: 82 MMHG | RESPIRATION RATE: 15 BRPM | HEIGHT: 64.5 IN | HEART RATE: 70 BPM

## 2023-12-01 DIAGNOSIS — R92.30 DENSE BREASTS, UNSPECIFIED: ICD-10-CM

## 2023-12-01 DIAGNOSIS — D24.1 BENIGN NEOPLASM OF RIGHT BREAST: ICD-10-CM

## 2023-12-01 DIAGNOSIS — C50.811 MALIGNANT NEOPLASM OF OVERLAPPING SITES OF RIGHT FEMALE BREAST: ICD-10-CM

## 2023-12-01 PROCEDURE — 99213 OFFICE O/P EST LOW 20 MIN: CPT

## 2024-06-14 ENCOUNTER — APPOINTMENT (OUTPATIENT)
Dept: BREAST CENTER | Facility: CLINIC | Age: 64
End: 2024-06-14
Payer: MEDICARE

## 2024-06-14 VITALS
WEIGHT: 180 LBS | DIASTOLIC BLOOD PRESSURE: 75 MMHG | HEIGHT: 64 IN | SYSTOLIC BLOOD PRESSURE: 117 MMHG | HEART RATE: 73 BPM | BODY MASS INDEX: 30.73 KG/M2

## 2024-06-14 DIAGNOSIS — N60.91 UNSPECIFIED BENIGN MAMMARY DYSPLASIA OF RIGHT BREAST: ICD-10-CM

## 2024-06-14 DIAGNOSIS — D05.01 LOBULAR CARCINOMA IN SITU OF RIGHT BREAST: ICD-10-CM

## 2024-06-14 DIAGNOSIS — Z85.3 PERSONAL HISTORY OF MALIGNANT NEOPLASM OF BREAST: ICD-10-CM

## 2024-06-14 DIAGNOSIS — Z80.3 FAMILY HISTORY OF MALIGNANT NEOPLASM OF BREAST: ICD-10-CM

## 2024-06-14 PROCEDURE — 99213 OFFICE O/P EST LOW 20 MIN: CPT

## 2024-06-14 NOTE — DATA REVIEWED
[FreeTextEntry1] : [facility] (12/23/2020) B/L MG- Dense. RUOQ focal asymmetries. RUOQ, 7-8FN coarse benign appearing calc present with question of adjacent amorphous calcs. L benign appearing calcs BIRADS 0  [facility] (2/4/2021) Dx R MG & US- Dense. RUOQ faint nodular opacity with a macrocalc. Additionally grouped microcalcs are present which are considered indeterminate. Bx is recommended because of the presence of microcalcs. The asymmetric opacities that were seen on the prior study of 12/23/2020 appear compressible. US- R 0.5cm, 8:30, 8FN hypoechoic nodule with a hyperechoic center, consistent with LN. R 0.7cm, 10:00, 2FN hypoechoic nodule, probably representing a midlly complex cyst. R 0.5cm, 10:00, 6FN hypoechoic nodule with central macrocalcification, this corresponds to the nodular opacity that is seen on the MG. (US guided core is recommended). BIRADS 4.   [facility] (2/22/2021) US guided core bx for R 0.5cm, 10:00, 6FN nodule- "Heart shaped" clip. Intraductal papilloma associated with calcs. Adjacent lobular carcinoma in situ associated with calcs. (Path in [place])   3/31/2021 ([facility]) bilateral breast MRI showing tissue marker in UOQ right breast at site of known high risk lesion, asymmetric segmental non mass enhancement associated with tissue marker, stereotactic biopsy of residual calcifications located posterior to known high risk lesion prior to excisional biopsy should be considered. BIRADS 4   4/6/2021 ([facility]) right stereotactic biopsy Right breast upper outer: breast tissue with atypical ductal hyperplasia and lobular carcinoma in situ (LCIS). Calcifications are present in LCIS and benign epithelium.  4/30/21 ([facility]) surgical path: 1. Breast, right, lesion 1; excision: - Lobular carcinoma in situ (LCIS), classic type - Intraductal papilloma - Biopsy site changes - Calcifications associated with LCIS and intraductal papilloma 2. Breast, right, lesion 2; excision: - Micro-invasive carcinoma (< 1 mm), see note and synoptic report - Margins: Anterior: 1 mm All remaining margins: over 5 mm - Lobular carcinoma in situ (LCIS), classic type - Biopsy site changes - Calcifications associated with LCIS 3. Breast, right, new deep margin lesion 2; excision: - Lobular carcinoma in situ (LCIS), classic type - Fibroadenomatoid changes - Calcifications associated with LCISER, [place] and HER-2 were attempted, but microinvasive carcinoma is not present on IHC slides.  11/15/21 ([facility]) MRI: No MRI evidence of malignancy. Patient will be due for mammographic imaging in February 2022. Given first mammographic imaging since surgical biopsy study should be performed as diagnostic imaging. BI-RADS 2.   12/6/21 ([facility]) b/l dx mmg and US: scattered areas of fibroglandular density. No mammographic or ultrasonographic evidence of malignancy in this patient status post excision of papilloma, ADH, and LCIS in the right breast. BI-RADS 2.   12/13/22 ([facility]) b/l dx mmg and US: heterogeneously dense. No e/o malignancy. BI-RADS 2.   6/2/23 ([facility]) MRI: No evidence of malignancy.  BI-RADS 1  12/1/2023 ([facility]) bilateral diagnostic mammogram and ultrasound: Heterogeneously dense.  No evidence of malignancy.  BI-RADS 2.  6/14/24 ([facility]) MRI: no evidence of malignancy. BI-RADS 1.

## 2024-06-14 NOTE — ASSESSMENT
[FreeTextEntry1] : 65 yo female presents with a history of right breast microinvasive carcinoma found on pathology from excisional biopsy for ADH/LCIS/IDP in 2021. She is doing well, without complaint. MRI today was benign, BI-RADS 1.  Reviewed that she should proceed with annual mammogram and ultrasound in 6 months & RTC after for reexamination.  Discussed the importance of self breast awareness.  Encouraged the patient to notify me if she notices any changes in her breasts in the interim.  All questions were answered; the patient verbalized understanding and is in agreement with the plan.

## 2024-06-14 NOTE — HISTORY OF PRESENT ILLNESS
[FreeTextEntry1] : 64 year old female who presents for follow-up of right breast microinvasive carcinoma (IHC unable to be performed) found on surgical pathology from a right breast excision on 4/30/21 that was done for intraductal papilloma, LCIS, and ADH. The patient met with Dr. Hernandez (med onc) and declined risk reducing medication; she also declined XRT. Pt denies palpable masses, skin lesions/changes, nipple discharge, or other breast complaints.  The patient is being managed with "natural estrogen blockers" by a naturopathic providers in conjunction with her PCP. She is also taking Hoxsey through her naturopathic provider's workup she was found to be hypothyroid, and is on levothyroxine.  She met with a genetics counselor and completed testing, and no pathogenic mutations were identified ([employer])

## 2024-06-14 NOTE — PHYSICAL EXAM
[Normocephalic] : normocephalic [No Supraclavicular Adenopathy] : no supraclavicular adenopathy [Examined in the supine and seated position] : examined in the supine and seated position [Symmetrical] : symmetrical [No dominant masses] : no dominant masses in right breast  [No dominant masses] : no dominant masses left breast [No Nipple Retraction] : no left nipple retraction [No Nipple Discharge] : no left nipple discharge [No Axillary Lymphadenopathy] : no left axillary lymphadenopathy [No Edema] : no edema [No Rashes] : no rashes [No Ulceration] : no ulceration [Breast Nipple Inversion] : nipples not inverted [Breast Nipple Retraction] : nipples not retracted [Breast Nipple Flattening] : nipples not flattened [Breast Nipple Fissures] : nipples not fissured [de-identified] : well healed circumareolar incision

## 2024-06-29 NOTE — REVIEW OF SYSTEMS
No care due was identified.  [facility] Embedded Care Due Messages. Reference number: [id number].   6/29/2024 5:36:34 PM CDT   [Negative] : Heme/Lymph

## 2024-12-13 ENCOUNTER — APPOINTMENT (OUTPATIENT)
Dept: BREAST CENTER | Facility: CLINIC | Age: 64
End: 2024-12-13
Payer: MEDICARE

## 2024-12-13 VITALS — BODY MASS INDEX: 31.07 KG/M2 | WEIGHT: 182 LBS | HEIGHT: 64 IN

## 2024-12-13 DIAGNOSIS — N60.91 UNSPECIFIED BENIGN MAMMARY DYSPLASIA OF RIGHT BREAST: ICD-10-CM

## 2024-12-13 DIAGNOSIS — Z85.3 ENCOUNTER FOR FOLLOW-UP EXAMINATION AFTER COMPLETED TREATMENT FOR MALIGNANT NEOPLASM: ICD-10-CM

## 2024-12-13 DIAGNOSIS — Z85.3 PERSONAL HISTORY OF MALIGNANT NEOPLASM OF BREAST: ICD-10-CM

## 2024-12-13 DIAGNOSIS — D05.01 LOBULAR CARCINOMA IN SITU OF RIGHT BREAST: ICD-10-CM

## 2024-12-13 DIAGNOSIS — Z08 ENCOUNTER FOR FOLLOW-UP EXAMINATION AFTER COMPLETED TREATMENT FOR MALIGNANT NEOPLASM: ICD-10-CM

## 2024-12-13 PROCEDURE — 99213 OFFICE O/P EST LOW 20 MIN: CPT

## 2024-12-13 RX ORDER — PNV NO.95/FERROUS FUM/FOLIC AC 28MG-0.8MG
TABLET ORAL
Refills: 0 | Status: ACTIVE | COMMUNITY

## 2025-06-13 ENCOUNTER — NON-APPOINTMENT (OUTPATIENT)
Age: 65
End: 2025-06-13

## 2025-06-13 ENCOUNTER — APPOINTMENT (OUTPATIENT)
Dept: BREAST CENTER | Facility: CLINIC | Age: 65
End: 2025-06-13
Payer: MEDICARE

## 2025-06-13 VITALS — BODY MASS INDEX: 30.05 KG/M2 | WEIGHT: 176 LBS | HEIGHT: 64 IN

## 2025-06-13 PROCEDURE — 99213 OFFICE O/P EST LOW 20 MIN: CPT

## 2025-06-16 ENCOUNTER — TRANSCRIPTION ENCOUNTER (OUTPATIENT)
Age: 65
End: 2025-06-16